# Patient Record
Sex: FEMALE | Race: OTHER | NOT HISPANIC OR LATINO | URBAN - METROPOLITAN AREA
[De-identification: names, ages, dates, MRNs, and addresses within clinical notes are randomized per-mention and may not be internally consistent; named-entity substitution may affect disease eponyms.]

---

## 2021-09-07 ENCOUNTER — EMERGENCY (EMERGENCY)
Facility: HOSPITAL | Age: 31
LOS: 1 days | Discharge: HOME | End: 2021-09-07
Admitting: STUDENT IN AN ORGANIZED HEALTH CARE EDUCATION/TRAINING PROGRAM
Payer: SELF-PAY

## 2021-09-07 VITALS
HEART RATE: 82 BPM | DIASTOLIC BLOOD PRESSURE: 82 MMHG | SYSTOLIC BLOOD PRESSURE: 118 MMHG | OXYGEN SATURATION: 99 % | RESPIRATION RATE: 18 BRPM | TEMPERATURE: 97 F

## 2021-09-07 PROCEDURE — 99283 EMERGENCY DEPT VISIT LOW MDM: CPT

## 2021-09-07 RX ORDER — IBUPROFEN 200 MG
400 TABLET ORAL ONCE
Refills: 0 | Status: COMPLETED | OUTPATIENT
Start: 2021-09-07 | End: 2021-09-07

## 2021-09-07 RX ORDER — TETANUS TOXOID, REDUCED DIPHTHERIA TOXOID AND ACELLULAR PERTUSSIS VACCINE, ADSORBED 5; 2.5; 8; 8; 2.5 [IU]/.5ML; [IU]/.5ML; UG/.5ML; UG/.5ML; UG/.5ML
0.5 SUSPENSION INTRAMUSCULAR ONCE
Refills: 0 | Status: COMPLETED | OUTPATIENT
Start: 2021-09-07 | End: 2021-09-07

## 2021-09-07 RX ADMIN — Medication 400 MILLIGRAM(S): at 17:57

## 2021-09-07 RX ADMIN — TETANUS TOXOID, REDUCED DIPHTHERIA TOXOID AND ACELLULAR PERTUSSIS VACCINE, ADSORBED 0.5 MILLILITER(S): 5; 2.5; 8; 8; 2.5 SUSPENSION INTRAMUSCULAR at 17:56

## 2021-09-07 NOTE — ED PROVIDER NOTE - OBJECTIVE STATEMENT
this is a 30 y.o female with no pMHx presented to the ED complaining of having right toe pain over the past few hours, patient states that she accidentally hit the pinky on the bed frame, states that the nail came off, she denies having any numbness or tingling, denies having any pain at this time, just concerned for potential infection and requesting antibiotics. Pt denies having any numbness tingling, nausea, vomiting, diarrhea, abdominal pain, CP, SOB, or MAHER. Pt is not a diabetic and no active bleeding.

## 2021-09-07 NOTE — ED PROVIDER NOTE - CLINICAL SUMMARY MEDICAL DECISION MAKING FREE TEXT BOX
this is a 30 y.o female with no pMHx presented to the ED complaining of having right toe pain over the past few hours, toe nail avulsion- bacitricin, tetanus f/u with podiatry no current infection.

## 2021-09-07 NOTE — ED ADULT TRIAGE NOTE - CHIEF COMPLAINT QUOTE
Pt complaining of R foot pinky toe pain and bleeding x few hours ago. Pt denies use of blood thinners. Pt ambulatory to triage.

## 2021-09-07 NOTE — ED PROVIDER NOTE - PATIENT PORTAL LINK FT
You can access the FollowMyHealth Patient Portal offered by [facility] by registering at the following website: [email or web address]. By joining [facility]’s FollowMyHealth portal, you will also be able to view your health information using other applications (apps) compatible with our system.

## 2021-09-07 NOTE — ED PROVIDER NOTE - SKIN COLOR
toe nail avulsion noted, no active bleeding, no redness, warmth, discharge, tenderness, around the toe. 5/5 strength noted in the foot/toe, no numbness or tingling. no active bleeding, no signs of infection./normal for race

## 2021-09-07 NOTE — ED PROVIDER NOTE - NSFOLLOWUPINSTRUCTIONS_ED_ALL_ED_FT
Rest, drink plenty of fluids.  Advance activity as tolerated.  Continue all previously prescribed medications as directed.  Follow up with your primary care physician in 48-72 hours- bring copies of your results.  Return to the ER for worsening or persistent symptoms, and/or ANY NEW OR CONCERNING SYMPTOMS. If you have issues obtaining follow up, please call: [phone] to obtain a doctor or specialist who takes your insurance in your area.  You may call [phone] to make an appointment with the internal medicine clinic.    Please follow up with your PCP or podiatry if symptoms persist.